# Patient Record
Sex: MALE | Race: WHITE | URBAN - METROPOLITAN AREA
[De-identification: names, ages, dates, MRNs, and addresses within clinical notes are randomized per-mention and may not be internally consistent; named-entity substitution may affect disease eponyms.]

---

## 2021-08-18 ENCOUNTER — OFFICE VISIT (OUTPATIENT)
Dept: URBAN - METROPOLITAN AREA CLINIC 51 | Facility: CLINIC | Age: 51
End: 2021-08-18
Payer: COMMERCIAL

## 2021-08-18 DIAGNOSIS — H04.123 TEAR FILM INSUFFICIENCY OF BILATERAL LACRIMAL GLANDS: ICD-10-CM

## 2021-08-18 DIAGNOSIS — Z83.518 FAMILY HISTORY OF OTHER SPECIFIED EYE DISORDER: Primary | ICD-10-CM

## 2021-08-18 PROCEDURE — 92004 COMPRE OPH EXAM NEW PT 1/>: CPT | Performed by: OPTOMETRIST

## 2021-08-18 PROCEDURE — 92134 CPTRZ OPH DX IMG PST SGM RTA: CPT | Performed by: OPTOMETRIST

## 2021-08-18 ASSESSMENT — VISUAL ACUITY
OS: 20/15
OD: 20/20

## 2021-08-18 ASSESSMENT — KERATOMETRY
OS: 43.98
OD: 43.49

## 2021-08-18 ASSESSMENT — INTRAOCULAR PRESSURE
OD: 15
OS: 17

## 2021-08-18 NOTE — IMPRESSION/PLAN
Impression: Family history of other specified eye disorder: Z80.1.  Plan: amd father 
start lutein
no nystagmus in office

## 2021-08-18 NOTE — IMPRESSION/PLAN
Impression: Tear film insufficiency of bilateral lacrimal glands: H04.123. Plan: excessive computer work 
use AT 
computer +1.50 at arms length 16 inches +2.25